# Patient Record
Sex: FEMALE | Race: OTHER | HISPANIC OR LATINO | Employment: UNEMPLOYED | URBAN - METROPOLITAN AREA
[De-identification: names, ages, dates, MRNs, and addresses within clinical notes are randomized per-mention and may not be internally consistent; named-entity substitution may affect disease eponyms.]

---

## 2018-06-06 ENCOUNTER — HOSPITAL ENCOUNTER (EMERGENCY)
Facility: HOSPITAL | Age: 53
Discharge: HOME | End: 2018-06-06
Payer: COMMERCIAL

## 2018-06-06 ENCOUNTER — APPOINTMENT (EMERGENCY)
Dept: RADIOLOGY | Facility: HOSPITAL | Age: 53
End: 2018-06-06
Payer: COMMERCIAL

## 2018-06-06 VITALS
OXYGEN SATURATION: 98 % | TEMPERATURE: 98.4 F | HEART RATE: 73 BPM | DIASTOLIC BLOOD PRESSURE: 69 MMHG | BODY MASS INDEX: 25.46 KG/M2 | SYSTOLIC BLOOD PRESSURE: 165 MMHG | RESPIRATION RATE: 18 BRPM | HEIGHT: 55 IN | WEIGHT: 110 LBS

## 2018-06-06 DIAGNOSIS — M54.50 ACUTE BILATERAL LOW BACK PAIN WITHOUT SCIATICA: ICD-10-CM
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
## 2018-06-06 DIAGNOSIS — M25.551 ACUTE RIGHT HIP PAIN: ICD-10-CM

## 2018-06-06 DIAGNOSIS — M54.2 NECK PAIN: ICD-10-CM

## 2018-06-06 PROCEDURE — 96372 THER/PROPH/DIAG INJ SC/IM: CPT

## 2018-06-06 PROCEDURE — 99284 EMERGENCY DEPT VISIT MOD MDM: CPT

## 2018-06-06 PROCEDURE — 73502 X-RAY EXAM HIP UNI 2-3 VIEWS: CPT

## 2018-06-06 RX ORDER — METHOCARBAMOL 500 MG/1
500 TABLET, FILM COATED ORAL ONCE
Status: COMPLETED | OUTPATIENT
Start: 2018-06-06 | End: 2018-06-06

## 2018-06-06 RX ORDER — METHOCARBAMOL 500 MG/1
500 TABLET, FILM COATED ORAL 4 TIMES DAILY
Qty: 20 TABLET | Refills: 0 | Status: SHIPPED | OUTPATIENT
Start: 2018-06-06 | End: 2018-06-11

## 2018-06-06 RX ORDER — KETOROLAC TROMETHAMINE 30 MG/ML
15 INJECTION, SOLUTION INTRAMUSCULAR; INTRAVENOUS ONCE
Status: COMPLETED | OUTPATIENT
Start: 2018-06-06 | End: 2018-06-06

## 2018-06-06 RX ORDER — NAPROXEN 500 MG/1
500 TABLET ORAL 2 TIMES DAILY WITH MEALS
Qty: 10 TABLET | Refills: 0 | Status: SHIPPED | OUTPATIENT
Start: 2018-06-06 | End: 2018-06-11

## 2018-06-06 RX ORDER — LIDOCAINE 50 MG/G
1 PATCH TOPICAL ONCE
Status: DISCONTINUED | OUTPATIENT
Start: 2018-06-06 | End: 2018-06-06 | Stop reason: HOSPADM

## 2018-06-06 RX ORDER — LIDOCAINE 50 MG/G
1 PATCH TOPICAL DAILY
Qty: 6 PATCH | Refills: 0 | Status: SHIPPED | OUTPATIENT
Start: 2018-06-06 | End: 2018-06-12

## 2018-06-06 RX ADMIN — KETOROLAC TROMETHAMINE 15 MG: 30 INJECTION, SOLUTION INTRAMUSCULAR at 11:32

## 2018-06-06 RX ADMIN — LIDOCAINE 1 PATCH: 50 PATCH TOPICAL at 11:32

## 2018-06-06 RX ADMIN — METHOCARBAMOL 500 MG: 500 TABLET ORAL at 11:32

## 2018-06-06 NOTE — DISCHARGE INSTRUCTIONS
- Rest, apply ice/heating pad over areas of pain  Take medications as needed as prescribed  Accidente automovilístico   LO QUE NECESITA SABER:   Los accidentes automovilísticos pueden causar lesiones ocasionadas por el impacto o por guillermo sido movido de un lado al otro dentro del tigist  Podría tener un hematoma en el abdomen, pecho o thang debido al cinturón de seguridad  También puede que tenga dolor en mauricio anna, thang o espalda  Podría sentir dolor en las rodillas, caderas o muslos si mauricio cuerpo golpea el tablero o el volante  El dolor muscular tiende a empeorar de 1 a 2 días después del accidente  INSTRUCCIONES SOBRE EL TORRES HOSPITALARIA:   Llame al 911 si presenta:   · Usted tiene un nuevo dolor de pecho o éste [facility], o tiene falta de [place]  Regrese a la kulwinder de emergencias si:   · Usted tiene un dolor nuevo o peor en el abdomen  · Usted tiene náuseas y vómitos que no mejoran  · Usted tiene un shawn dolor de anastasia  · Usted tiene debilidad, hormigueo o adormecimiento en edilma brazos o piernas  · Usted tiene un dolor nuevo o peor que le dificulta el movimiento  Pregúntele a mauricio [place] vitaminas y minerales son adecuados para usted  · Usted tiene dolor que aparece de 2 a 3 días después del accidente  · Usted tiene preguntas o inquietudes acerca de mauricio condición o cuidado  Medicamentos:   · Analgésicos:  Usted podría recibir medicamento para quitarle o reducir el dolor  No espere a que el dolor sea muy intenso para elpidio el medicamento  · AINEs (Analgésicos antiinflamatorios no esteroides) osman el ibuprofeno, ayudan a disminuir la inflamación, el dolor y la [place]  Tre medicamento esta disponible con o sin bishop receta médica  Los AINEs pueden causar sangrado estomacal o problemas renales en ciertas personas  Si usted esta tomando un anticoágulante,  siempre  pregunte si los AINEs son seguros para usted  Siempre vikki la etiqueta de tre medicamento y [place] instrucciones   No administre tre medicamento a niños menores de 6 meses de catrina sin antes obtener la autorización de mauricio médico      · [place] edilma medicamentos osman se le haya indicado  Consulte con mauricio médico si usted sherine que mauricio medicamento no le está ayudando o si presenta efectos secundarios  Infórmele si es alérgico a algún medicamento  Mantenga bishop lista actualizada de los [place], las vitaminas y los productos herbales que zia  Incluya los siguientes datos de los medicamentos: cantidad, frecuencia y motivo de administración  Traiga con usted la lista o los envases de la píldoras a edilma citas de seguimiento  Lleve la lista de los medicamentos con usted en solitario de bishop emergencia  Acuda a edilma consultas de control con mauricio médico según le indicaron  Anote edilma preguntas para que se acuerde de hacerlas krysta edilma visitas  Consejos de seguridad:   · Use siempre mauricio cinturón de seguridad  El [place] de mauricio cinturón de seguridad ayudará a reducir las lesiones sufridas por accidentes automovilísticos  · Use asientos de seguridad para niños  Es necesario que mauricio lulu se siente en un asiento de seguridad para niños hecho para mauricio edad, altura, y [place]  Pregúntele a mauricio médico sobre [address] acerca de los asientos de seguridad para niños  · [place] velocidad  Maneje mauricio tigist al límite de velocidad para reducir mauricio riesgo de accidentes automovilísticos  · No maneje si se siente cansado  Usted reacciona más lentamente cuando está cansado  El tiempo de reacción lento aumentará el riesgo de un accidente automovilístico      · No hable por teléfono ni envíe mensajes de texto [employer]  Usted no reaccionará lo suficientemente rápido en bishop emergencia si se distrae con mensajes de texto o conversaciones  · No [facility] y Alber Castro  Use un chofer designado  Llame un taxi o pídale a alguien que lo lleve a casa si ha estado bebiendo alcohol  No permita que edilma amigos manejen si corona estado bebiendo alcohol       · No consuma drogas ilegales y [employer] [place]  Es probable que se sienta más cansado o tome riesgos que usualmente no tomaría  No maneje después de elpidio medicamentos prescritos que le dan sueño  Cuidados personales:   · Use hielo y calor  El hielo ayuda a disminuir la inflamación y el dolor  El hielo también puede contribuir a evitar el daño de los tejidos  Use un paquete de hielo o ponga hielo molido dentro de [employer]  Cúbrala con bishop toalla y aplíquela al área adolorida por 15 a 20 minutos cada hora o osman se le indique  Después de 2 días, use bishop compresa caliente [employer]  Aplique calor osman se lo recomiende el médico      · Estire deilma músculos cuidadosamente  Devante ejercicios suaves para estirar edilma músculos después de guillermo sufrido un accidente automovilístico  Consulte con durant médico sobre cuáles ejercicios hacer  © 2017 [facility] Information is for End User's use only and may not be sold, redistributed or otherwise used for commercial purposes  All illustrations and images included in CareNotes® are the copyrighted property of A D A M , Inc  or Tushar Larson  Esta información es sólo para uso en educación  Durant intención no es darle un consejo médico sobre enfermedades o tratamientos  Colsulte con durant Dianna Boers farmacéutico antes de seguir cualquier régimen médico para saber si es seguro y efectivo para usted  Dolor de espalda   LO QUE NECESITA SABER:   ¿Qué devante saber sobre el dolor de espalda? El dolor de espalda es común  Usted puede estar adolorido o sentir rigidez en kaylie o ambos lados de la espalda  El dolor se puede propagar a edilma glúteos o muslos  ¿Qué causa o aumenta mi riesgo de tener dolor de espalda? Las condiciones que afectan la columna, las articulaciones, o los músculos pueden causar el dolor de espalda   Estos pueden incluir la artritis, estenosis de la alicia dorsal (estrechamiento de la columna vertebral), tensión muscular o descomposición de los discos de la columna vertebral  Los siguientes aumentan mauricio riesgo de presentar dolor de espalda:  · Inclinarse o levantar de bishop forma repetitiva o girar o levantar artículos pesados    · Lesión debido a bishop caída o accidente    · Falta de actividad física regular     · Obesidad, embarazo     · Fumar    · Envejecimiento    · Manejar, estar sentado o de pie por mucho tiempo    · [place] postura mientras está sentado o de pie  ¿Cómo se diagnostica mauricio dolor de espalda? Mauricio médico le preguntará si tiene [employer]  El proveedor le puede preguntar cuál es mauricio historial del dolor de espalda y cómo comenzó  Le revisará la forma osman se pone de pie y camina, y el rango de [place]  Muéstrele donde siente el dolor y que empeora o mejora el dolor  Explique el dolor, que tan shawn es y el tiempo que le dura  Coméntele si el dolor empeora en la noche o cuando se [place]  ¿Cuál es el tratamiento para el dolor de espalda? · AINEs (Analgésicos antiinflamatorios no esteroides)  ayudan a disminuir la inflamación y el dolor  Tre medicamento esta disponible con o sin bishop receta médica  Los AINEs pueden causar sangrado estomacal o problemas renales en ciertas personas  Si usted zia un medicamento anticoagulante, siempre pregúntele a mauricio médico si los MICHEL son seguros para usted  Siempre vikki la etiqueta de tre medicamento y [place] instrucciones  · El acetaminofén  [employer]  Está disponible sin receta médica  Pregunte la cantidad y la frecuencia con que debe tomarlos  [address]  El acetaminofén puede causar daño en el hígado cuando no se zia de forma correcta  · Un medicamento con receta para el dolor  podrían ser Sherle Hedge  Pregunte al médico cómo debe elpidio tre medicamento de forma friend  ¿Cómo puedo manejar mi dolor de espalda? · Aplique hielo  en mauricio espalda o en el área afectada de 15 a 20 minutos cada hora o según le indicaron   Use un paquete de hielo o ponga hielo molido dentro de bishop bolsa plástica  Cúbrala con bishop toalla  El hielo disminuye el dolor y ayuda a evitar el daño en los tejidos  · La aplicación de calor  en mauricio espalda o en el área afectada de 20 a 30 minutos cada 2 horas krysta los días que le indicaron  El calor ayuda a disminuir el dolor y los espasmos musculares  · Manténgase activo  lo más que pueda sin causar [employer]  El reposo en cama puede empeorar mauricio dolor de espalda  Evite levantar objetos hasta que ya no tenga dolor  ¿Cuándo devante comunicarme con mi médico?   · Usted tiene dolor de espalda que no mejora con el reposo, ni con el medicamento para el dolor  · Usted tiene fiebre  · Usted tiene un dolor que empeora cuando está acostado boca [place] o al descansar  · Usted tiene dolor que empeora cuando tose o estornuda  · Usted pierde peso sin proponérselo  · Usted tiene preguntas o inquietudes acerca de mauricio condición o cuidado  ¿Cuándo devante buscar atención inmediata o llamar al 911? · Usted tiene dolor, entumecimiento o debilidad en bishop o en ambas piernas  · El dolor se vuelve tan intenso que lo incapacita para caminar  · Usted no puede controlar mauricio orina ni edilma deposiciones intestinales  · Usted tiene dolor de espalda intenso con dolor en el pecho  · Usted tiene dolor de espalda intenso, náuseas y vómito  · Usted tiene un dolor de espalda intenso que se propaga a un costado o al área genital   ACUERDOS SOBRE MAURICIO CUIDADO:   Usted tiene el derecho de ayudar a planear mauricio cuidado  Aprenda todo lo que pueda sobre mauricio condición y osman darle tratamiento  Discuta edlima opciones de tratamiento con edilma médicos para decidir el cuidado que usted desea recibir  Usted siempre tiene el derecho de rechazar el tratamiento  Esta información es sólo para uso en educación  Mauricio intención no es darle un consejo médico sobre enfermedades o tratamientos   Colsulte con mauricio Stephane Soheila farmacéutico antes de seguir cualquier [place] médico para saber si es seguro y efectivo para usted  © 2017 [facility] MIRAGE Information is for End User's use only and may not be sold, redistributed or otherwise used for commercial purposes  All illustrations and images included in CareNotes® are the copyrighted property of A D A M , Inc  or Tushar Larson  Dolor de thang kristen   LO QUE NECESITA SABER:   ¿Que necesito saber acerca del dolor de thang kristen? El dolor de thang kristen comienza repentinamente, [place] rápidamente y desaparece en unos días  El dolor podría ir y venir, o podría empeorar con ciertos movimientos  El dolor podría sentirse solamente en mauricio thang, o podría pasarse a edilma brazos, espalda u hombros  También podría sentir dolor que comienza en otra área de mauricio cuerpo y se pasa a mauricio thang  ¿Qué provoca o aumenta mi riesgo de tener dolor de thang kristen? El dolor de thang kristen con frecuencia se debe a la distensión muscular o al desgarro de un ligamento  Cualquiera de lo siguiente podría provocar dolor de thang kristen:  · Inflamación en los discos de mauricio thang    · Bishop condición que afecta los nervios del thang a los brazos, osman el síndrome de salida torácica o neuritis braquial     · Trauma o lesión en mauricio thang, osman al sufrir un golpe por detrás en un tigist (latigazo cervical) o por dormir en bishop keegan posición    · Herpes o bishop infección osman meningitis  ¿Cómo se diagnostica la causa del dolor de thang kristen? Mauricio médico le preguntará acerca de edilma síntomas y cuándo comenzaron  Infórmele si usted estuvo recientemente en un accidente o si tuvo alguna otra lesión en mauricio thang  Él examinará mauricio thang y hombros  También le pedirá que mueva mauricio anastasia y brazos de cierta forma para determinar si alguna posición provoca o jaylene el dolor  · Los análisis de cristopher:  podrían usarse para medir el nivel de inflamación o para revisar si hay signos de bishop infección      · Radiografía o imagen por Hershell Markus (IRM)  podrían mostrar bishop lesión de mauricio thang o bishop condición [place]  No entre a la kulwinder donde se realiza la resonancia magnética con algo de metal  El metal puede causar daños graves  Dígale al médico si usted tiene algo de metal por dentro o sobre mauricio cuerpo  ¿Cómo se trata el dolor de thang kristen? El tratamiento dependerá de lo que está provocando mauricio dolor  · Medicamentos,  podrían ser recetados o recomendados para el dolor por mauricio médico  Es posible que usted necesite medicamento para tratar el dolor de nervios o para detener los espasmos musculares  También podrían darle medicamentos para reducir la inflamación  Mauricio médico podría inyectar medicamento a un nervio para bloquear el dolor  El medicamento de venta sin receta AINEs o acetaminofén podría ser recomendado para tratar el dolor o inflamación leve  · La tracción  se [place] para aliviar la presión de los nervios  Le estirarán la anastasia cuidadosamente alejándola de mauricio thang  [place] estira los músculos y los ligamentos y le da más espacio a mauricio columna  Mauricio médico le indicará qué tipo de tracción ayudará a mauricio dolor de thang  No  use dispositivos de tracción en mauricio casa a menos que se lo indique mauricio médico   ¿Qué puedo hacer para manejar o evitar el dolor de thang kristen? · Repose el thang osman se lo indiquen  No realice movimientos repentinos, osman voltear mauricio anastasia rápidamente  Mauricio médico podría recomendarle que use un collarín cervical por un periodo corto de [place]  El collarín evitará que usted [place] mauricio [place]  [place] ayudará a darle tiempo a mauricio thang para que sane si es que bishop lesión está provocando mauricio dolor  Pregunte a mauricio médico cuándo puede volver a practicar deportes o realizar otras actividades cotidianas  · Aplique calor según indicaciones  El calor ayuda a aliviar el dolor y la inflamación  Use bishop envoltura caliente o empape bishop toalla pequeña en agua tibia  Escurra el exceso de [place]   Aplique la venda caliente o la toalla por 20 minutos cada hora o osman se le indique  · Aplique hielo según las indicaciones  El hielo ayuda a aliviar el dolor y la inflamación, y a evitar daño al tejido  Use un paquete con hielo o ponga hielo en bishop bolsa  Cubra el paquete con hielo o la espalda con bishop toalla antes de aplicarlo en mauricio thang  Aplique el paquete de hielo o la bolsa por 15 minutos cada hora o osman se lo indiquen  Mauricio médico puede indicarle con qué frecuencia aplicar el hielo  · Devante ejercicios para el thang osman se lo indiquen  Los ejercicios para el thang ayudan a [employer] y a aumentar el rango de [address]  Mauricio médico le indicará cuáles ejercicios son adecuados para usted  Podría darle instrucciones o podría recomendarle que acuda con un fisioterapeuta  Mauricio médico o terapeuta pueden asegurarse que usted esté haciendo estos ejercicios correctamente  · Mantenga bishop buena postura  Trate de mantener mauricio anastasia y hombros levantados cuando se siente  Si usted trabaja en frente de bishop computadora, asegúrese de que el monitor esté al nivel adecuado  Usted no debería tener que mirar hacia abajo para harika la pantalla  [place] debe tener que inclinarse hacia adelante para poder leer lo que está en la pantalla  Asegúrese de que mauricio teclado, ratón y otros artículos de computadora estén colocados en donde usted no tenga que extender edilma hombros para alcanzarlos  [place] a menudo si usted trabaja frente a bishop computadora o permanece sentado krysta largos períodos  Estírese o camine para [employer] de mauricio thang relajados  ¿Cuándo devante buscar atención inmediata? · Usted tiene bishop lesión que le provoca dolor en el thang y dolor punzante debajo de edilma brazos o piernas  · Mauricio dolor de thang se agrava repentinamente  · Usted tiene dolor de thang junto con entumecimiento, hormigueo o debilidad en edilma brazos o piernas  · Usted tiene rigidez en el thang, dolor de [place] y [place]    ¿Cuándo devante comunicarme con mi médico? · Usted tiene nuevos síntomas o edilma síntomas empeoran  · Edilma síntomas continúan aún después del [place]  · Usted tiene preguntas o inquietudes acerca de mauricio condición o cuidado  ACUERDOS SOBRE MAURICIO CUIDADO:   Usted tiene el derecho de ayudar a planear mauricio cuidado  Aprenda todo lo que pueda sobre mauricio condición y osman darle tratamiento  Discuta edilma opciones de tratamiento con edilma médicos para decidir el cuidado que usted desea recibir  Usted siempre tiene el derecho de rechazar el tratamiento  Esta información es sólo para uso en educación  Mauricio intención no es darle un consejo médico sobre enfermedades o tratamientos  Colsulte con mauricio Shawna Dontae farmacéutico antes de seguir cualquier régimen médico para saber si es seguro y efectivo para usted  © 2017 [facility] Information is for End User's use only and may not be sold, redistributed or otherwise used for commercial purposes  All illustrations and images included in CareNotes® are the copyrighted property of A QUINN [employer] WAYNE , Inc  or Tushar Larson  Dolor músculoesquelético   LO QUE NECESITA SABER:   El dolor muscular puede ser sordo, molesto o [place]  También uede sentir dolor o sensibilidad al tacto  Puede ocurrir en cualquier rashaad del cuerpo y a menudo es por guillermo hecho ejercicio  El dolor muscular puede proceder de bishop lesión, osman un esguince, tendinitis o bishop fractura ósea  El dolor muscular puede ser también resultado de condiciones médicas osman polimiositis (miopatía inflamatoria idiopática), fibromialgia y trastornos del tejido conjuntivo  INSTRUCCIONES SOBRE EL TORRES HOSPITALARIA:   Cuidado personal:   · Descanse  osman se le indique y evite la actividad que le cause dolor  Podrá volver a la actividad normal cuando pueda moverse sin sentir dolor  Siga las indicaciones adecuadas para el descanso y la [place]  Bishop vez desaparecidos los síntomas, tendrá de sufrir lesiones krysta 3 semanas después       · El hielo  a la rashaad del músculo que le duele para disminuir el dolor y la hinchazón  Use un paquete de hielo o ponga cubitos de hielo en bishop bolsa plástica y envuélvala con bishop toalla  Siempre debe guillermo bishop edna entre el hielo y la piel  Aplique el hielo con la frecuencia que se le indique krysta las primeras 24 a 48 horas  · La compresión  con bishop tablilla, soporte o venda elástica ayuda a disminuir el dolor y la hinchazón  Puede que la necesite para el dolor muscular en los brazos o piernas  Jenny Sheer, soporte o venda elástica también ayudarán a proteger la rashaad dolorida cuando usted se mueve  · Eleve  la pierna o el brazo que le duele para reducir la hinchazón y el dolor  Eleve el miembro mientras esté sentado o acostado  Apoye la pierna con dolor sobre almohadones para mantenerla por encima del corazón  Medicamentos:   · AINEs (Analgésicos antiinflamatorios no esteroides)  pueden disminuir la inflamación y el dolor o la fiebre  Tre medicamento esta disponible con o sin bishop receta médica  Los AINEs pueden causar sangrado estomacal o problemas renales en ciertas personas  Si usted zia un medicamento anticoagulante, siempre pregúntele a mauricio médico si los MICHEL son seguros para usted  Siempre vikki la etiqueta de tre medicamento y [place] instrucciones  · El acetaminofén  sirve para reducir el dolor  Está disponible sin receta médica  Pregunte a mauricio médico cuánto elpidio y cuándo tomarlos  [facility]  El acetaminofén puede causar daño en el hígado cuando no se zia de forma correcta  No tome más de un medicamento que contenga acetaminofén a menos que se lo indiquen  · Relajantes musculares  ayudar a relajar los músculos y reducir el dolor y los espasmos musculares  · Esteroides  se pueden administrar para reducir el enrojecimiento, el dolor y la hinchazón  · [place] edilma medicamentos osman se le haya indicado    Consulte con mauricio médico si usted sherine que mauricio medicamento no le está ayudando o si presenta efectos secundarios  Infórmele si es alérgico a cualquier medicamento  Mantenga bishop lista actualizada de los [place], las vitaminas y los productos herbales que zia  Incluya los siguientes datos de los medicamentos: cantidad, frecuencia y motivo de administración  Traiga con usted la lista o los envases de la píldoras a edilma citas de seguimiento  Lleve la lista de los medicamentos con usted en solitario de bishop emergencia  Acuda a edilma consultas de control con durant médico según le indicaron  Rashi vez tenga que hacerse más pruebas para que los médicos puedan descubrir la causa de durant dolor muscular  Es posible que necesite fisioterapia para aprender algunos ejercicios de fortalecimiento muscular  Anote edilma preguntas para que se acuerde de hacerlas krysta edilma visitas  Pregúntele a durant [place] vitaminas y minerales son adecuados para usted  · Usted tiene fiebre  · [employer] dormir a causa del dolor  · La rashaad dolorida se hace más sensible, rojiza y caliente al tacto  · Usted tiene alonzo capacidad de movimiento en la rashaad dolorida  · Usted tiene preguntas o inquietudes acerca de durant condición o cuidado  Regrese a la kulwinder de emergencias si:   · Nota un dolor de mayor intensidad al  la rashaad muscular que le duele  · Ha perdido sensibilidad en la rashaad muscular que le duele  · Se le ha hinchado la rashaad dolorida o la hinchazón ha aumentado  Puede que sienta la piel tirante  · Ha aumentado el dolor muscular o el dolor no mejora con el [place]  © 2017 [address] Information is for End User's use only and may not be sold, redistributed or otherwise used for commercial purposes  All illustrations and images included in CareNotes® are the copyrighted property of A D A M , Inc  or Tushar Larson  Esta información es sólo para uso en educación  Durant intención no es darle un consejo médico sobre enfermedades o tratamientos   Colsulte con durant Suresh Arms farmacéutico antes de seguir cualquier régimen médico para saber si es seguro y efectivo para usted
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
